# Patient Record
Sex: FEMALE | Race: WHITE | NOT HISPANIC OR LATINO | Employment: FULL TIME | URBAN - METROPOLITAN AREA
[De-identification: names, ages, dates, MRNs, and addresses within clinical notes are randomized per-mention and may not be internally consistent; named-entity substitution may affect disease eponyms.]

---

## 2023-04-25 DIAGNOSIS — N93.8 DUB (DYSFUNCTIONAL UTERINE BLEEDING): Primary | ICD-10-CM

## 2023-04-25 PROBLEM — K52.9 GASTROENTERITIS: Status: ACTIVE | Noted: 2023-04-25

## 2023-04-25 PROBLEM — R87.612 LGSIL ON PAP SMEAR OF CERVIX: Status: ACTIVE | Noted: 2023-04-25

## 2023-04-25 PROBLEM — N76.0 ACUTE VAGINITIS: Status: RESOLVED | Noted: 2023-04-25 | Resolved: 2023-04-25

## 2023-04-25 PROBLEM — L20.9 ATOPIC DERMATITIS: Status: ACTIVE | Noted: 2023-04-25

## 2023-04-25 PROBLEM — B27.90 INFECTIOUS MONONUCLEOSIS: Status: RESOLVED | Noted: 2023-04-25 | Resolved: 2023-04-25

## 2023-04-25 PROBLEM — R53.83 FATIGUE: Status: ACTIVE | Noted: 2023-04-25

## 2023-04-25 PROBLEM — R63.4 ABNORMAL WEIGHT LOSS: Status: ACTIVE | Noted: 2023-04-25

## 2023-04-25 PROBLEM — S90.31XA CONTUSION OF FOOT, RIGHT: Status: ACTIVE | Noted: 2023-04-25

## 2023-04-25 PROBLEM — D72.829 LEUKOCYTOSIS: Status: ACTIVE | Noted: 2023-04-25

## 2023-04-25 PROBLEM — R63.5 ABNORMAL WEIGHT GAIN: Status: ACTIVE | Noted: 2023-04-25

## 2023-04-25 PROBLEM — R87.622 LGSIL PAP SMEAR OF VAGINA: Status: ACTIVE | Noted: 2023-04-25

## 2023-04-25 PROBLEM — R19.7 DIARRHEA: Status: ACTIVE | Noted: 2023-04-25

## 2023-04-25 PROBLEM — R87.810 CERVICAL HIGH RISK HPV (HUMAN PAPILLOMAVIRUS) TEST POSITIVE: Status: ACTIVE | Noted: 2023-04-25

## 2023-04-25 PROBLEM — R74.8 ELEVATED LIVER ENZYMES: Status: ACTIVE | Noted: 2023-04-25

## 2023-04-25 PROBLEM — G47.00 INSOMNIA: Status: ACTIVE | Noted: 2023-04-25

## 2023-04-25 PROBLEM — J02.9 SORE THROAT: Status: RESOLVED | Noted: 2023-04-25 | Resolved: 2023-04-25

## 2023-04-25 PROBLEM — R87.610 ASCUS OF CERVIX WITH NEGATIVE HIGH RISK HPV: Status: ACTIVE | Noted: 2023-04-25

## 2023-04-25 RX ORDER — DROSPIRENONE AND ETHINYL ESTRADIOL 0.02-3(28)
1 KIT ORAL DAILY
COMMUNITY
End: 2023-05-04 | Stop reason: SDUPTHER

## 2023-04-25 RX ORDER — DROSPIRENONE AND ETHINYL ESTRADIOL 0.02-3(28)
1 KIT ORAL DAILY
Qty: 84 TABLET | Refills: 0 | Status: SHIPPED | OUTPATIENT
Start: 2023-04-25 | End: 2023-05-04 | Stop reason: SDUPTHER

## 2023-05-04 ENCOUNTER — OFFICE VISIT (OUTPATIENT)
Dept: PRIMARY CARE | Facility: CLINIC | Age: 28
End: 2023-05-04
Payer: COMMERCIAL

## 2023-05-04 ENCOUNTER — LAB (OUTPATIENT)
Dept: LAB | Facility: LAB | Age: 28
End: 2023-05-04
Payer: COMMERCIAL

## 2023-05-04 VITALS
BODY MASS INDEX: 22.53 KG/M2 | HEART RATE: 87 BPM | DIASTOLIC BLOOD PRESSURE: 74 MMHG | HEIGHT: 64 IN | SYSTOLIC BLOOD PRESSURE: 117 MMHG | WEIGHT: 132 LBS | OXYGEN SATURATION: 97 % | TEMPERATURE: 97.9 F

## 2023-05-04 DIAGNOSIS — E55.9 VITAMIN D DEFICIENCY: ICD-10-CM

## 2023-05-04 DIAGNOSIS — R19.7 DIARRHEA, UNSPECIFIED TYPE: ICD-10-CM

## 2023-05-04 DIAGNOSIS — R87.612 LGSIL ON PAP SMEAR OF CERVIX: ICD-10-CM

## 2023-05-04 DIAGNOSIS — R53.83 FATIGUE, UNSPECIFIED TYPE: ICD-10-CM

## 2023-05-04 DIAGNOSIS — Z00.00 ROUTINE GENERAL MEDICAL EXAMINATION AT A HEALTH CARE FACILITY: ICD-10-CM

## 2023-05-04 DIAGNOSIS — Z00.00 ROUTINE GENERAL MEDICAL EXAMINATION AT A HEALTH CARE FACILITY: Primary | ICD-10-CM

## 2023-05-04 DIAGNOSIS — N93.8 DUB (DYSFUNCTIONAL UTERINE BLEEDING): ICD-10-CM

## 2023-05-04 LAB
ANION GAP IN SER/PLAS: 14 MMOL/L (ref 10–20)
CALCIUM (MG/DL) IN SER/PLAS: 9.3 MG/DL (ref 8.6–10.3)
CARBON DIOXIDE, TOTAL (MMOL/L) IN SER/PLAS: 24 MMOL/L (ref 21–32)
CHLORIDE (MMOL/L) IN SER/PLAS: 103 MMOL/L (ref 98–107)
CREATININE (MG/DL) IN SER/PLAS: 0.84 MG/DL (ref 0.5–1.05)
ERYTHROCYTE DISTRIBUTION WIDTH (RATIO) BY AUTOMATED COUNT: 11.6 % (ref 11.5–14.5)
ERYTHROCYTE MEAN CORPUSCULAR HEMOGLOBIN CONCENTRATION (G/DL) BY AUTOMATED: 31.7 G/DL (ref 32–36)
ERYTHROCYTE MEAN CORPUSCULAR VOLUME (FL) BY AUTOMATED COUNT: 97 FL (ref 80–100)
ERYTHROCYTES (10*6/UL) IN BLOOD BY AUTOMATED COUNT: 4.27 X10E12/L (ref 4–5.2)
GFR FEMALE: >90 ML/MIN/1.73M2
GLUCOSE (MG/DL) IN SER/PLAS: 89 MG/DL (ref 74–99)
HEMATOCRIT (%) IN BLOOD BY AUTOMATED COUNT: 41.6 % (ref 36–46)
HEMOGLOBIN (G/DL) IN BLOOD: 13.2 G/DL (ref 12–16)
LEUKOCYTES (10*3/UL) IN BLOOD BY AUTOMATED COUNT: 5.8 X10E9/L (ref 4.4–11.3)
PLATELETS (10*3/UL) IN BLOOD AUTOMATED COUNT: 290 X10E9/L (ref 150–450)
POTASSIUM (MMOL/L) IN SER/PLAS: 4 MMOL/L (ref 3.5–5.3)
SODIUM (MMOL/L) IN SER/PLAS: 137 MMOL/L (ref 136–145)
THYROTROPIN (MIU/L) IN SER/PLAS BY DETECTION LIMIT <= 0.05 MIU/L: 2.07 MIU/L (ref 0.44–3.98)
UREA NITROGEN (MG/DL) IN SER/PLAS: 11 MG/DL (ref 6–23)

## 2023-05-04 PROCEDURE — 80048 BASIC METABOLIC PNL TOTAL CA: CPT

## 2023-05-04 PROCEDURE — 82306 VITAMIN D 25 HYDROXY: CPT

## 2023-05-04 PROCEDURE — 1036F TOBACCO NON-USER: CPT | Performed by: FAMILY MEDICINE

## 2023-05-04 PROCEDURE — 36415 COLL VENOUS BLD VENIPUNCTURE: CPT

## 2023-05-04 PROCEDURE — 84443 ASSAY THYROID STIM HORMONE: CPT

## 2023-05-04 PROCEDURE — 99395 PREV VISIT EST AGE 18-39: CPT | Performed by: FAMILY MEDICINE

## 2023-05-04 PROCEDURE — 85027 COMPLETE CBC AUTOMATED: CPT

## 2023-05-04 RX ORDER — DROSPIRENONE AND ETHINYL ESTRADIOL 0.02-3(28)
1 KIT ORAL DAILY
Qty: 84 TABLET | Refills: 3 | Status: SHIPPED | OUTPATIENT
Start: 2023-05-04 | End: 2024-05-03 | Stop reason: SDUPTHER

## 2023-05-04 NOTE — PATIENT INSTRUCTIONS
Get your blood work as ordered.  You should hear from our office with results whether they are normal are not within a few days.  Please call the office if you do not hear from us.     You should be getting cardiovascular exercise 3-5 times per week for 30-45 minutes.  This includes exercises such as running, brisk walking, biking or swimming.       Continue with birth control pills as prescribed    Insomnia  MEASURES TO IMPROVE SLEEP QUALITY  1. Go to sleep and wake up at the same time every day, even on holidays and weekends.  2. Use the bed for sleep and sexual activity only, not for reading, watching television, or working. No SCREENS - tablets, phones.  The light emitted from these devices can affect your natural sleep chemical production and negatively affect sleep.  3. Avoid napping for more than 20-30min and not within 6 hours of bedtime.  4. If sleep does not begin within 20 to 30 minutes, leave the bed and return only when drowsy.  5. Keep the room quiet, cool, and dark.  6. Use ear plugs to decrease ambient noise.  7. Avoid caffeine within six hours of bedtime perhaps AT ALL as some people can become very caffeine sensitive.  8. Avoid alcohol and smoking - although both seem to cause a relaxing effect - they in fact will cause poor quality sleep.  9. Establish relaxation pre-sleep rituals, such as a warm bath or reading.  10. Wear socks to bed (lowers core body temperature).  11. Exercise earlier in the day.    IF WAKE UP prior to desired time and cannot fall back asleep within 10min, use technique of concentrating on what you feel of your body - pressure of pillow, mattress, blankets on body.  This focus will distract your cortex of brain that keeps you awake thinking of all that happened prior, or might happen tomorrow keeping you awake.  By turning off the cortex and focusing on basic sense of feel you can relax your brain and allow falling asleep.     Over-the-counter melatonin can oftentimes help  with sleeping, 5-8 mg at night before you go to bed as the recommended dose.  Melatonin does not cause any dependence and can use on a regular basis.  Other sleep medications including some of the over-the-counter medications such as diphenhydramine can cause dependence when used on a regular basis and should only be used intermittently when really needed.Insomnia  MEASURES TO IMPROVE SLEEP QUALITY  1. Go to sleep and wake up at the same time every day, even on holidays and weekends.  2. Use the bed for sleep and sexual activity only, not for reading, watching television, or working. No SCREENS - tablets, phones.  The light emitted from these devices can affect your natural sleep chemical production and negatively affect sleep.  3. Avoid napping for more than 20-30min and not within 6 hours of bedtime.  4. If sleep does not begin within 20 to 30 minutes, leave the bed and return only when drowsy.  5. Keep the room quiet, cool, and dark.  6. Use ear plugs to decrease ambient noise.  7. Avoid caffeine within six hours of bedtime perhaps AT ALL as some people can become very caffeine sensitive.  8. Avoid alcohol and smoking - although both seem to cause a relaxing effect - they in fact will cause poor quality sleep.  9. Establish relaxation pre-sleep rituals, such as a warm bath or reading.  10. Wear socks to bed (lowers core body temperature).  11. Exercise earlier in the day.    IF WAKE UP prior to desired time and cannot fall back asleep within 10min, use technique of concentrating on what you feel of your body - pressure of pillow, mattress, blankets on body.  This focus will distract your cortex of brain that keeps you awake thinking of all that happened prior, or might happen tomorrow keeping you awake.  By turning off the cortex and focusing on basic sense of feel you can relax your brain and allow falling asleep.     Over-the-counter melatonin can oftentimes help with sleeping, 5-8 mg at night before you go to  bed as the recommended dose.  Melatonin does not cause any dependence and can use on a regular basis.  Other sleep medications including some of the over-the-counter medications such as diphenhydramine can cause dependence when used on a regular basis and should only be used intermittently when really needed.    Referral to GI for persisting loose stools

## 2023-05-04 NOTE — PROGRESS NOTES
Subjective   Patient ID: Kait Garnica is a 27 y.o. female who presents for Annual Exam. Just started working for [employer]. No complaints.     Patient is here for follow-up per routine physical  Is on birth control, tolerating well  Low-grade ALFONSO had a colposcopy by GYN last year    Some exercise   No CP or SOB     Fatigue     Loose stools routinely   Last several years few times per day   No elimination diets   No black or blood in stools      ROS :  ( No or Yes )  Any eye problems:    N  Frequent nasal congestion or sneezing:  N  Difficulty hearing:  N  Ear problems:   N  Asthma or wheezing:   N  Frequent cough:   N  Shortness of breath:N  Hemoptysis: N  Hx of TB: N  High blood pressure: N  Heart disease: N  Heart murmur:N  Chest pain or pressure with exertion:N  Leg pains with walking up hill: N  Fast heartbeat or palpitations:N  Varicose veins: N  Difficulty swallowing foods or liquids: N  Abdominal pains: N  Frequent indigestion or heartburn: N  Constipation: N  Diarrhea or loose stools: y  Weight changes recently: y  Change in bowel movements: N  An ulcer: N  Black stools: N  Jaundice, hepatitis or liver problems: N  Gallstones or gallbladder problems: N  Stomach or intestinal problems: N  Vomited blood : N  Blood in bowel movements: N  Sickle cell trait  or Anemia: N  Been refused as a blood donor: N  Problems with her kidney, bladder, or prostate: N  Loss of control of your urine: N  Pain or burning with urination: N  Blood in her urine: N  Trouble starting flow of urine: N  Frequent urination at night: N  History of venereal disease: N  Any skin problems: N  Diabetes: N  Thyroid disease: N  Frequent back pain: N  Pain or swelling around joints: N  Broken any bones: N  Frequent headaches: N  Dizziness: N  Have you ever had Seizures or convulsions: N  Have you ever temporarily lost control of your hand or foot : N   Had a stroke or been paralyzed : N  Temporarily lost your  "ability to speak: N  Fainted or lost consciousness: N  Hallucinations: N  Nervousness: N  Do you take medications for your nerves: N  Trouble falling asleep or staying asleep: y  Do you feel tired even after a good night sleep: y  Do you feel down in the dumps or depressed: y  Frequent crying: N  Using alcohol excessively: N  Any street drug use : N  Do have any other medical problems that are concerns :     Review of Systems    Objective   /74   Pulse 87   Temp 36.6 °C (97.9 °F)   Ht 1.626 m (5' 4\")   Wt 59.9 kg (132 lb)   SpO2 97%   BMI 22.66 kg/m²     Physical Exam  Constitutional:       General: She is not in acute distress.     Appearance: Normal appearance.   HENT:      Head: Normocephalic and atraumatic.      Right Ear: Tympanic membrane, ear canal and external ear normal.      Left Ear: Tympanic membrane, ear canal and external ear normal.      Mouth/Throat:      Mouth: Mucous membranes are moist.      Pharynx: Oropharynx is clear.   Eyes:      Extraocular Movements: Extraocular movements intact.      Conjunctiva/sclera: Conjunctivae normal.      Pupils: Pupils are equal, round, and reactive to light.   Neck:      Vascular: No carotid bruit.   Cardiovascular:      Rate and Rhythm: Normal rate and regular rhythm.      Pulses: Normal pulses.      Heart sounds: Normal heart sounds. No murmur heard.  Pulmonary:      Effort: Pulmonary effort is normal.      Breath sounds: Normal breath sounds. No wheezing, rhonchi or rales.   Abdominal:      General: Abdomen is flat. Bowel sounds are normal. There is no distension.      Palpations: Abdomen is soft.      Tenderness: There is no abdominal tenderness. There is no guarding or rebound.   Musculoskeletal:         General: Normal range of motion.      Cervical back: No tenderness.   Lymphadenopathy:      Cervical: No cervical adenopathy.   Skin:     General: Skin is warm and dry.      Findings: No rash.   Neurological:      General: No focal deficit present. "      Mental Status: She is alert and oriented to person, place, and time.      Cranial Nerves: No cranial nerve deficit.      Coordination: Coordination normal.      Gait: Gait normal.   Psychiatric:         Mood and Affect: Mood normal.         Behavior: Behavior normal.         Assessment/Plan   Problem List Items Addressed This Visit       Diarrhea    Relevant Orders    Referral to Gastroenterology    DUB (dysfunctional uterine bleeding)    Relevant Medications    drospirenone-ethinyl estradioL (Renetta, Gianvi) 3-0.02 mg tablet    Other Relevant Orders    CBC    Basic Metabolic Panel    Vitamin D, Total    Tsh With Reflex To Free T4 If Abnormal    Fatigue    Relevant Medications    drospirenone-ethinyl estradioL (Renetta, Gianvi) 3-0.02 mg tablet    Other Relevant Orders    CBC    Basic Metabolic Panel    Vitamin D, Total    Tsh With Reflex To Free T4 If Abnormal    LGSIL on Pap smear of cervix    Relevant Medications    drospirenone-ethinyl estradioL (Renetta, Gianvi) 3-0.02 mg tablet    Other Relevant Orders    CBC    Basic Metabolic Panel    Vitamin D, Total    Tsh With Reflex To Free T4 If Abnormal     Other Visit Diagnoses       Routine general medical examination at a health care facility    -  Primary    Relevant Medications    drospirenone-ethinyl estradioL (Renetta, Gianvi) 3-0.02 mg tablet    Other Relevant Orders    CBC    Basic Metabolic Panel    Vitamin D, Total    Tsh With Reflex To Free T4 If Abnormal    Vitamin D deficiency        Relevant Medications    drospirenone-ethinyl estradioL (Renetta, Gianvi) 3-0.02 mg tablet    Other Relevant Orders    CBC    Basic Metabolic Panel    Vitamin D, Total    Tsh With Reflex To Free T4 If Abnormal

## 2023-05-05 ENCOUNTER — TELEPHONE (OUTPATIENT)
Dept: PRIMARY CARE | Facility: CLINIC | Age: 28
End: 2023-05-05
Payer: COMMERCIAL

## 2023-05-05 LAB — CALCIDIOL (25 OH VITAMIN D3) (NG/ML) IN SER/PLAS: 24 NG/ML

## 2023-05-05 NOTE — TELEPHONE ENCOUNTER
Result Communication    Resulted Orders   CBC   Result Value Ref Range    WBC 5.8 4.4 - 11.3 x10E9/L    RBC 4.27 4.00 - 5.20 x10E12/L    Hemoglobin 13.2 12.0 - 16.0 g/dL    Hematocrit 41.6 36.0 - 46.0 %    MCV 97 80 - 100 fL    MCHC 31.7 (L) 32.0 - 36.0 g/dL    Platelets 290 150 - 450 x10E9/L    RDW 11.6 11.5 - 14.5 %   Basic Metabolic Panel   Result Value Ref Range    Glucose 89 74 - 99 mg/dL    Sodium 137 136 - 145 mmol/L    Potassium 4.0 3.5 - 5.3 mmol/L    Chloride 103 98 - 107 mmol/L    Bicarbonate 24 21 - 32 mmol/L    Anion Gap 14 10 - 20 mmol/L    Urea Nitrogen 11 6 - 23 mg/dL    Creatinine 0.84 0.50 - 1.05 mg/dL    GFR Female >90 >90 mL/min/1.73m2      Comment:       CALCULATIONS OF ESTIMATED GFR ARE PERFORMED   USING THE 2021 CKD-EPI STUDY REFIT EQUATION   WITHOUT THE RACE VARIABLE FOR THE IDMS-TRACEABLE   CREATININE METHODS.    https://jasn.asnjournals.org/content/early/2021/09/22/ASN.[email or web address]    Calcium 9.3 8.6 - 10.3 mg/dL   Vitamin D, Total   Result Value Ref Range    Vitamin D, 25-Hydroxy 24 (A) ng/mL      Comment:      .  DEFICIENCY:         < 20   NG/ML  INSUFFICIENCY:      20-29  NG/ML  SUFFICIENCY:         NG/ML    THIS ASSAY ACCURATELY QUANTIFIES THE SUM OF  VITAMIN D3, 25-HYDROXY AND VIT D2,25-HYDROXY.   Tsh With Reflex To Free T4 If Abnormal   Result Value Ref Range    TSH 2.07 0.44 - 3.98 mIU/L      Comment:       TSH testing is performed using different testing    methodology at [facility] than at other    [facility] hospitals. Direct result comparisons should    only be made within the same method.       9:46 AM      Results were successfully communicated with the patient and they acknowledged their understanding.

## 2023-05-05 NOTE — RESULT ENCOUNTER NOTE
Please notify pt of lab results everything is normal except vitamin D level is a little low recommend increasing intake by 1000 units/day

## 2023-05-05 NOTE — TELEPHONE ENCOUNTER
----- Message from Alycia Escobar MD sent at 5/5/2023  8:10 AM EDT -----  Please notify pt of lab results everything is normal except vitamin D level is a little low recommend increasing intake by 1000 units/day

## 2023-10-03 ENCOUNTER — OFFICE VISIT (OUTPATIENT)
Dept: PRIMARY CARE | Facility: CLINIC | Age: 28
End: 2023-10-03
Payer: COMMERCIAL

## 2023-10-03 VITALS
HEIGHT: 64 IN | TEMPERATURE: 98.4 F | HEART RATE: 89 BPM | DIASTOLIC BLOOD PRESSURE: 84 MMHG | BODY MASS INDEX: 21.68 KG/M2 | SYSTOLIC BLOOD PRESSURE: 130 MMHG | WEIGHT: 127 LBS | OXYGEN SATURATION: 99 %

## 2023-10-03 DIAGNOSIS — N76.0 ACUTE VAGINITIS: Primary | ICD-10-CM

## 2023-10-03 DIAGNOSIS — R30.0 DYSURIA: ICD-10-CM

## 2023-10-03 DIAGNOSIS — N89.8 VAGINAL ITCHING: ICD-10-CM

## 2023-10-03 LAB
POC APPEARANCE, URINE: CLEAR
POC BILIRUBIN, URINE: NEGATIVE
POC BLOOD, URINE: ABNORMAL
POC COLOR, URINE: YELLOW
POC GLUCOSE, URINE: NEGATIVE MG/DL
POC KETONES, URINE: ABNORMAL MG/DL
POC LEUKOCYTES, URINE: NEGATIVE
POC NITRITE,URINE: NEGATIVE
POC PH, URINE: 6.5 PH
POC PROTEIN, URINE: ABNORMAL MG/DL
POC SPECIFIC GRAVITY, URINE: 1.02
POC UROBILINOGEN, URINE: 0.2 EU/DL

## 2023-10-03 PROCEDURE — 81003 URINALYSIS AUTO W/O SCOPE: CPT | Performed by: FAMILY MEDICINE

## 2023-10-03 PROCEDURE — 99213 OFFICE O/P EST LOW 20 MIN: CPT | Performed by: FAMILY MEDICINE

## 2023-10-03 PROCEDURE — 1036F TOBACCO NON-USER: CPT | Performed by: FAMILY MEDICINE

## 2023-10-03 NOTE — PATIENT INSTRUCTIONS
Vaginitis likely yeast    Unremarkable urine     We will send STD tests    If further concern would suggest blood work for STDs also

## 2023-10-03 NOTE — PROGRESS NOTES
"Subjective   Patient ID: Kait Garnica is a 28 y.o. female who presents for Vaginal Discharge and Vaginal Itching.    2 weeks   Itching and discharge   Some burning on outside     No recent Abx ,    No Otc meds tried     Possible STD exposure   Period since Sat     Vaginal Discharge  The patient's primary symptoms include genital itching and vaginal discharge. This is a new problem. The current episode started 1 to 4 weeks ago. The problem occurs intermittently. The problem has been unchanged. The patient is experiencing no pain. The problem affects both sides. She is not pregnant. The vaginal discharge was milky. Nothing aggravates the symptoms. She has tried nothing for the symptoms.   Vaginal Itching  The patient's primary symptoms include genital itching and vaginal discharge.        Review of Systems   Genitourinary:  Positive for vaginal discharge.       Objective   /84   Pulse 89   Temp 36.9 °C (98.4 °F)   Ht 1.619 m (5' 3.75\")   Wt 57.6 kg (127 lb)   LMP 09/30/2023 (Approximate)   SpO2 99%   BMI 21.97 kg/m²     Physical Exam  Chaperone present: Chaperone offered for breast & pelvic exam - patient declines..   Genitourinary:     Exam position: Lithotomy position.      Pubic Area: No rash.       Labia:         Right: No rash or lesion.         Left: No rash or lesion.       Urethra: No prolapse, urethral pain, urethral swelling or urethral lesion.      Vagina: Vaginal discharge present. No bleeding or lesions.      Cervix: Normal.      Uterus: Normal. Not deviated, not enlarged, not fixed, not tender and no uterine prolapse.       Adnexa: Right adnexa normal and left adnexa normal.        Right: No mass, tenderness or fullness.          Left: No mass, tenderness or fullness.        Rectum: Normal. No external hemorrhoid.      Comments: Erythematous vulva and vagina  Neurological:      General: No focal deficit present.   Psychiatric:         Mood and Affect: Mood normal.         Assessment/Plan "   Problem List Items Addressed This Visit    None  Visit Diagnoses         Codes    Acute vaginitis    -  Primary N76.0    Relevant Orders    Vaginitis Gram Stain For Bacterial Vaginosis + Yeast    C. Trachomatis / N. Gonorrhoeae, Amplified Detection    Trichomonas vaginalis, Amplified    Vaginal itching     N89.8    Relevant Orders    POCT UA Automated manually resulted (Completed)    Vaginitis Gram Stain For Bacterial Vaginosis + Yeast    C. Trachomatis / N. Gonorrhoeae, Amplified Detection    Trichomonas vaginalis, Amplified    Dysuria     R30.0    Relevant Orders    Vaginitis Gram Stain For Bacterial Vaginosis + Yeast    C. Trachomatis / N. Gonorrhoeae, Amplified Detection    Trichomonas vaginalis, Amplified

## 2023-10-04 DIAGNOSIS — N89.8 VAGINAL ITCHING: Primary | ICD-10-CM

## 2023-10-04 LAB
C TRACH RRNA SPEC QL NAA+PROBE: NEGATIVE
CLUE CELLS VAG LPF-#/AREA: NORMAL /[LPF]
N GONORRHOEA DNA SPEC QL PROBE+SIG AMP: NEGATIVE
NUGENT SCORE: 1
T VAGINALIS RRNA SPEC QL NAA+PROBE: NEGATIVE
YEAST VAG WET PREP-#/AREA: NORMAL

## 2023-10-04 RX ORDER — FLUCONAZOLE 150 MG/1
150 TABLET ORAL ONCE
Qty: 1 TABLET | Refills: 0 | Status: SHIPPED | OUTPATIENT
Start: 2023-10-04 | End: 2023-10-04

## 2023-11-16 ENCOUNTER — OFFICE VISIT (OUTPATIENT)
Dept: PRIMARY CARE | Facility: CLINIC | Age: 28
End: 2023-11-16
Payer: COMMERCIAL

## 2023-11-16 VITALS
OXYGEN SATURATION: 98 % | SYSTOLIC BLOOD PRESSURE: 124 MMHG | HEIGHT: 64 IN | WEIGHT: 132 LBS | DIASTOLIC BLOOD PRESSURE: 86 MMHG | HEART RATE: 89 BPM | BODY MASS INDEX: 22.53 KG/M2 | TEMPERATURE: 98.2 F

## 2023-11-16 DIAGNOSIS — J40 BRONCHITIS: Primary | ICD-10-CM

## 2023-11-16 PROCEDURE — 99213 OFFICE O/P EST LOW 20 MIN: CPT | Performed by: FAMILY MEDICINE

## 2023-11-16 PROCEDURE — 1036F TOBACCO NON-USER: CPT | Performed by: FAMILY MEDICINE

## 2023-11-16 RX ORDER — AZITHROMYCIN 250 MG/1
TABLET, FILM COATED ORAL
Qty: 6 TABLET | Refills: 0 | Status: SHIPPED | OUTPATIENT
Start: 2023-11-16 | End: 2023-11-21

## 2023-11-16 ASSESSMENT — ENCOUNTER SYMPTOMS
COUGH: 1
SORE THROAT: 1

## 2023-11-16 NOTE — PROGRESS NOTES
Subjective   Patient ID: Kait Garnica is a 28 y.o. female who presents for URI. Home covid test negative. Concerned with pneumonia.    Cough   Chest heaviness   Last 4 days     Fever, ST     URI   This is a new problem. The current episode started in the past 7 days. The problem has been gradually worsening. The maximum temperature recorded prior to her arrival was 101 - 101.9 F. The fever has been present for Less than 1 day. Associated symptoms include coughing and a sore throat. She has tried NSAIDs and decongestant for the symptoms. The treatment provided mild relief.        Review of Systems   HENT:  Positive for sore throat.    Respiratory:  Positive for cough.        Objective   There were no vitals taken for this visit.    Physical Exam  Constitutional:       Appearance: Normal appearance.   HENT:      Head: Normocephalic and atraumatic.      Right Ear: Tympanic membrane and ear canal normal.      Left Ear: Tympanic membrane and ear canal normal.      Nose: No nasal deformity.      Right Sinus: No maxillary sinus tenderness or frontal sinus tenderness.      Left Sinus: No maxillary sinus tenderness or frontal sinus tenderness.      Mouth/Throat:      Mouth: Mucous membranes are moist. No oral lesions.      Tongue: No lesions.      Pharynx: Oropharynx is clear.   Cardiovascular:      Rate and Rhythm: Normal rate and regular rhythm.   Pulmonary:      Effort: Pulmonary effort is normal.      Breath sounds: Normal breath sounds.      Comments: Wet cough  Musculoskeletal:      Cervical back: No tenderness.   Lymphadenopathy:      Cervical: No cervical adenopathy.   Neurological:      Mental Status: She is alert.         Assessment/Plan   Problem List Items Addressed This Visit    None  Visit Diagnoses         Codes    Bronchitis    -  Primary J40    Relevant Medications    azithromycin (Zithromax) 250 mg tablet

## 2023-11-29 ENCOUNTER — OFFICE VISIT (OUTPATIENT)
Dept: PRIMARY CARE | Facility: CLINIC | Age: 28
End: 2023-11-29
Payer: COMMERCIAL

## 2023-11-29 VITALS
TEMPERATURE: 98.1 F | DIASTOLIC BLOOD PRESSURE: 85 MMHG | SYSTOLIC BLOOD PRESSURE: 143 MMHG | HEIGHT: 64 IN | BODY MASS INDEX: 22.53 KG/M2 | WEIGHT: 132 LBS | OXYGEN SATURATION: 96 % | HEART RATE: 102 BPM

## 2023-11-29 DIAGNOSIS — J01.00 ACUTE NON-RECURRENT MAXILLARY SINUSITIS: Primary | ICD-10-CM

## 2023-11-29 PROCEDURE — 99213 OFFICE O/P EST LOW 20 MIN: CPT | Performed by: FAMILY MEDICINE

## 2023-11-29 PROCEDURE — 1036F TOBACCO NON-USER: CPT | Performed by: FAMILY MEDICINE

## 2023-11-29 RX ORDER — DOXYCYCLINE 100 MG/1
100 CAPSULE ORAL 2 TIMES DAILY
Qty: 20 CAPSULE | Refills: 0 | Status: SHIPPED | OUTPATIENT
Start: 2023-11-29 | End: 2023-12-09

## 2023-11-29 ASSESSMENT — ENCOUNTER SYMPTOMS
COUGH: 1
FEVER: 0
SHORTNESS OF BREATH: 0

## 2023-11-29 NOTE — PROGRESS NOTES
"Subjective   Patient ID: Kait Garnica is a 28 y.o. female who presents for Cough. Finished the Zpak you prescribed at her previous visit. [place] better for about three days then began feeling ill again this past Sunday. Cough, sinus congestion and drainage in addition to a bad HA. Taking OTC Cough drops. Pt did not retest for covid.    Felt better on Abx for a few days   Cough persisting      Slight sob with congestion          Review of Systems   Constitutional:  Negative for fever.   HENT:  Positive for congestion.    Respiratory:  Positive for cough. Negative for shortness of breath.        Objective   /85   Pulse 102   Temp 36.7 °C (98.1 °F)   Ht 1.619 m (5' 3.75\")   Wt 59.9 kg (132 lb)   SpO2 96%   BMI 22.84 kg/m²     Physical Exam  Constitutional:       Appearance: Normal appearance.   HENT:      Head: Normocephalic and atraumatic.      Right Ear: Tympanic membrane and ear canal normal.      Left Ear: Tympanic membrane and ear canal normal.      Nose: No nasal deformity.      Right Sinus: Frontal sinus tenderness present. No maxillary sinus tenderness.      Left Sinus: Frontal sinus tenderness present. No maxillary sinus tenderness.      Mouth/Throat:      Mouth: Mucous membranes are moist. No oral lesions.      Tongue: No lesions.      Pharynx: Oropharynx is clear.   Cardiovascular:      Rate and Rhythm: Normal rate and regular rhythm.   Pulmonary:      Effort: Pulmonary effort is normal.      Breath sounds: Normal breath sounds.   Musculoskeletal:      Cervical back: No tenderness.   Lymphadenopathy:      Cervical: No cervical adenopathy.   Neurological:      Mental Status: She is alert.         Assessment/Plan   Problem List Items Addressed This Visit    None  Visit Diagnoses         Codes    Acute non-recurrent maxillary sinusitis    -  Primary J01.00    Relevant Medications    doxycycline (Vibramycin) 100 mg capsule               "

## 2024-05-01 PROBLEM — N87.0 CIN I (CERVICAL INTRAEPITHELIAL NEOPLASIA I): Status: ACTIVE | Noted: 2024-05-01

## 2024-05-02 ENCOUNTER — OFFICE VISIT (OUTPATIENT)
Dept: PRIMARY CARE | Facility: CLINIC | Age: 29
End: 2024-05-02
Payer: COMMERCIAL

## 2024-05-02 VITALS
HEIGHT: 64 IN | OXYGEN SATURATION: 98 % | WEIGHT: 143 LBS | DIASTOLIC BLOOD PRESSURE: 84 MMHG | SYSTOLIC BLOOD PRESSURE: 132 MMHG | BODY MASS INDEX: 24.41 KG/M2 | HEART RATE: 88 BPM | TEMPERATURE: 98.2 F

## 2024-05-02 DIAGNOSIS — T75.3XXA MOTION SICKNESS, INITIAL ENCOUNTER: ICD-10-CM

## 2024-05-02 DIAGNOSIS — R53.83 FATIGUE, UNSPECIFIED TYPE: ICD-10-CM

## 2024-05-02 DIAGNOSIS — Z00.00 ROUTINE GENERAL MEDICAL EXAMINATION AT A HEALTH CARE FACILITY: Primary | ICD-10-CM

## 2024-05-02 DIAGNOSIS — R79.89 LOW VITAMIN D LEVEL: ICD-10-CM

## 2024-05-02 DIAGNOSIS — R63.5 WEIGHT GAIN: ICD-10-CM

## 2024-05-02 PROBLEM — K52.9 GASTROENTERITIS: Status: RESOLVED | Noted: 2023-04-25 | Resolved: 2024-05-02

## 2024-05-02 PROBLEM — R87.612 LGSIL ON PAP SMEAR OF CERVIX: Status: RESOLVED | Noted: 2023-04-25 | Resolved: 2024-05-02

## 2024-05-02 PROBLEM — R87.622 LGSIL PAP SMEAR OF VAGINA: Status: RESOLVED | Noted: 2023-04-25 | Resolved: 2024-05-02

## 2024-05-02 PROBLEM — R19.7 DIARRHEA: Status: RESOLVED | Noted: 2023-04-25 | Resolved: 2024-05-02

## 2024-05-02 PROBLEM — S90.31XA CONTUSION OF FOOT, RIGHT: Status: RESOLVED | Noted: 2023-04-25 | Resolved: 2024-05-02

## 2024-05-02 PROCEDURE — 99395 PREV VISIT EST AGE 18-39: CPT | Performed by: FAMILY MEDICINE

## 2024-05-02 PROCEDURE — 1036F TOBACCO NON-USER: CPT | Performed by: FAMILY MEDICINE

## 2024-05-02 RX ORDER — ONDANSETRON 4 MG/1
4 TABLET, FILM COATED ORAL EVERY 8 HOURS PRN
Qty: 20 TABLET | Refills: 1 | Status: SHIPPED | OUTPATIENT
Start: 2024-05-02 | End: 2024-05-17

## 2024-05-02 RX ORDER — SCOLOPAMINE TRANSDERMAL SYSTEM 1 MG/1
1 PATCH, EXTENDED RELEASE TRANSDERMAL
Qty: 4 PATCH | Refills: 0 | Status: SHIPPED | OUTPATIENT
Start: 2024-05-02 | End: 2024-07-01

## 2024-05-02 ASSESSMENT — PATIENT HEALTH QUESTIONNAIRE - PHQ9
SUM OF ALL RESPONSES TO PHQ9 QUESTIONS 1 AND 2: 0
1. LITTLE INTEREST OR PLEASURE IN DOING THINGS: NOT AT ALL
2. FEELING DOWN, DEPRESSED OR HOPELESS: NOT AT ALL

## 2024-05-02 ASSESSMENT — ENCOUNTER SYMPTOMS: FATIGUE: 1

## 2024-05-02 NOTE — PROGRESS NOTES
Subjective   Patient ID: Kait Garnica is a 28 y.o. female who presents for Annual Exam.  Pt is going on cruise and was wondering about motion sick medication        Is going on a cruise     Concerned about motion sickness     Some exercise     Changed ocps , had nausea with it     Some weight gain        ROS :  ( No or Yes )  Any eye problems:    N  Frequent nasal congestion or sneezing:  N  Difficulty hearing:  N  Ear problems:   N  Asthma or wheezing:   N  Frequent cough:   N  Shortness of breath:N  Hemoptysis: N  Hx of TB: N  High blood pressure: N  Heart disease: N  Heart murmur:N  Chest pain or pressure with exertion:N  Leg pains with walking up hill: N  Fast heartbeat or palpitations:N  Varicose veins: N  Difficulty swallowing foods or liquids: N  Abdominal pains: N  Frequent indigestion or heartburn: y  Constipation: N  Diarrhea or loose stools: y  Weight changes recently: y  Change in bowel movements: N  An ulcer: N  Black stools: N  Jaundice, hepatitis or liver problems: N  Gallstones or gallbladder problems: N  Stomach or intestinal problems: N  Vomited blood : N  Blood in bowel movements: N  Sickle cell trait  or Anemia: N  Been refused as a blood donor: N  Problems with her kidney, bladder, or prostate: N  Loss of control of your urine: N  Pain or burning with urination: N  Blood in her urine: N  Trouble starting flow of urine: N  Frequent urination at night: N  History of venereal disease: N  Any skin problems: N  Diabetes: N  Thyroid disease: N  Frequent back pain: N  Pain or swelling around joints: N  Broken any bones: N  Frequent headaches: N  Dizziness: N  Have you ever had Seizures or convulsions: N  Have you ever temporarily lost control of your hand or foot : N   Had a stroke or been paralyzed : N  Temporarily lost your ability to speak: N  Fainted or lost consciousness: N  Hallucinations: N  Nervousness: N  Do you take medications for your nerves: N  Trouble falling asleep or staying asleep:  "y  Do you feel tired even after a good night sleep: y  Do you feel down in the dumps or depressed: N  Frequent crying: N  Using alcohol excessively: N  Any street drug use : N  Do have any other medical problems that are concerns :      Review of Systems   Constitutional:  Positive for fatigue.       Objective   /84   Pulse 88   Temp 36.8 °C (98.2 °F)   Ht 1.619 m (5' 3.75\")   Wt 64.9 kg (143 lb)   LMP 04/13/2024   SpO2 98%   BMI 24.74 kg/m²     Physical Exam  Constitutional:       General: She is not in acute distress.     Appearance: Normal appearance.   HENT:      Head: Normocephalic and atraumatic.      Right Ear: Tympanic membrane, ear canal and external ear normal.      Left Ear: Tympanic membrane, ear canal and external ear normal.      Nose: Nose normal.      Mouth/Throat:      Mouth: Mucous membranes are moist.      Pharynx: No oropharyngeal exudate or posterior oropharyngeal erythema.   Eyes:      Extraocular Movements: Extraocular movements intact.      Conjunctiva/sclera: Conjunctivae normal.      Pupils: Pupils are equal, round, and reactive to light.   Cardiovascular:      Rate and Rhythm: Normal rate and regular rhythm.      Heart sounds: No murmur heard.  Pulmonary:      Effort: Pulmonary effort is normal.      Breath sounds: Normal breath sounds.   Abdominal:      General: Bowel sounds are normal.      Palpations: Abdomen is soft.   Musculoskeletal:         General: Normal range of motion.      Cervical back: No rigidity.   Lymphadenopathy:      Cervical: No cervical adenopathy.   Skin:     General: Skin is warm and dry.      Findings: No rash.   Neurological:      General: No focal deficit present.      Mental Status: She is alert and oriented to person, place, and time.      Cranial Nerves: No cranial nerve deficit.      Gait: Gait normal.   Psychiatric:         Mood and Affect: Mood normal.         Behavior: Behavior normal.         Assessment/Plan   Problem List Items Addressed This " Visit             ICD-10-CM    Fatigue R53.83    Relevant Orders    CBC    Thyroid Stimulating Hormone    Lipid Panel    Comprehensive Metabolic Panel    Thyroxine, Free    Hemoglobin A1C     Other Visit Diagnoses         Codes    Routine general medical examination at a health care facility    -  Primary Z00.00    Relevant Orders    CBC    Thyroid Stimulating Hormone    Lipid Panel    Comprehensive Metabolic Panel    Thyroxine, Free    Hemoglobin A1C    Low vitamin D level     R79.89    Relevant Orders    CBC    Thyroid Stimulating Hormone    Lipid Panel    Comprehensive Metabolic Panel    Thyroxine, Free    Hemoglobin A1C    Motion sickness, initial encounter     T75.3XXA    Relevant Medications    scopolamine (Transderm-Scop) 1 mg over 3 days patch 3 day    ondansetron (Zofran) 4 mg tablet    Other Relevant Orders    CBC    Thyroid Stimulating Hormone    Lipid Panel    Comprehensive Metabolic Panel    Thyroxine, Free    Hemoglobin A1C    Weight gain     R63.5    Relevant Orders    CBC    Thyroid Stimulating Hormone    Lipid Panel    Comprehensive Metabolic Panel    Thyroxine, Free    Hemoglobin A1C

## 2024-05-02 NOTE — PATIENT INSTRUCTIONS
Get your blood work as ordered.  You should hear from our office with results whether they are normal are not within a few days.  Please call the office if you do not hear from usAfter you get testing done you will get notified from our office with regards to your results whether they are normal or not.  If you are registered in the electronic health record we will send you information in that system.  If you have any questions or need clarification please feel free to call the office    You should be getting cardiovascular exercise 3-5 times per week for 30-45 minutes.  This includes exercises such as running, brisk walking, biking or swimming.      Get your blood work as ordered.  You should hear from our office with results whether they are normal are not within a few days.  Please call the office if you do not hear from us.      Trial of patches   Or zofran     See what works best

## 2024-05-03 ENCOUNTER — TELEPHONE (OUTPATIENT)
Dept: PRIMARY CARE | Facility: CLINIC | Age: 29
End: 2024-05-03
Payer: COMMERCIAL

## 2024-05-03 DIAGNOSIS — Z00.00 ROUTINE GENERAL MEDICAL EXAMINATION AT A HEALTH CARE FACILITY: ICD-10-CM

## 2024-05-03 DIAGNOSIS — N93.8 DUB (DYSFUNCTIONAL UTERINE BLEEDING): ICD-10-CM

## 2024-05-03 DIAGNOSIS — R87.612 LGSIL ON PAP SMEAR OF CERVIX: ICD-10-CM

## 2024-05-03 DIAGNOSIS — R53.83 FATIGUE, UNSPECIFIED TYPE: ICD-10-CM

## 2024-05-03 DIAGNOSIS — E55.9 VITAMIN D DEFICIENCY: ICD-10-CM

## 2024-05-03 NOTE — TELEPHONE ENCOUNTER
MEDICATION REFILL    Pharmacy Name:     [facility]/  Medication requested          Drospirenone-ethinyl estradiol 3-0.02  Dosage   1 daily  Quantity    90 days    Allergies     none given  Date of last visit     05/02/2024  Date of Next Appointment [  ]

## 2024-05-06 RX ORDER — DROSPIRENONE AND ETHINYL ESTRADIOL 0.02-3(28)
1 KIT ORAL DAILY
Qty: 84 TABLET | Refills: 3 | Status: SHIPPED | OUTPATIENT
Start: 2024-05-06

## 2024-05-09 ENCOUNTER — LAB (OUTPATIENT)
Dept: LAB | Facility: LAB | Age: 29
End: 2024-05-09
Payer: COMMERCIAL

## 2024-05-09 DIAGNOSIS — R63.5 WEIGHT GAIN: ICD-10-CM

## 2024-05-09 DIAGNOSIS — R53.83 FATIGUE, UNSPECIFIED TYPE: ICD-10-CM

## 2024-05-09 DIAGNOSIS — T75.3XXA MOTION SICKNESS, INITIAL ENCOUNTER: ICD-10-CM

## 2024-05-09 DIAGNOSIS — Z00.00 ROUTINE GENERAL MEDICAL EXAMINATION AT A HEALTH CARE FACILITY: ICD-10-CM

## 2024-05-09 DIAGNOSIS — R79.89 LOW VITAMIN D LEVEL: ICD-10-CM

## 2024-05-09 LAB
ALBUMIN SERPL BCP-MCNC: 4 G/DL (ref 3.4–5)
ALP SERPL-CCNC: 33 U/L (ref 33–110)
ALT SERPL W P-5'-P-CCNC: 14 U/L (ref 7–45)
ANION GAP SERPL CALC-SCNC: 15 MMOL/L (ref 10–20)
AST SERPL W P-5'-P-CCNC: 18 U/L (ref 9–39)
BILIRUB SERPL-MCNC: 0.4 MG/DL (ref 0–1.2)
BUN SERPL-MCNC: 9 MG/DL (ref 6–23)
CALCIUM SERPL-MCNC: 9 MG/DL (ref 8.6–10.3)
CHLORIDE SERPL-SCNC: 103 MMOL/L (ref 98–107)
CHOLEST SERPL-MCNC: 191 MG/DL (ref 0–199)
CHOLESTEROL/HDL RATIO: 2.8
CO2 SERPL-SCNC: 25 MMOL/L (ref 21–32)
CREAT SERPL-MCNC: 0.81 MG/DL (ref 0.5–1.05)
EGFRCR SERPLBLD CKD-EPI 2021: >90 ML/MIN/1.73M*2
ERYTHROCYTE [DISTWIDTH] IN BLOOD BY AUTOMATED COUNT: 11.9 % (ref 11.5–14.5)
EST. AVERAGE GLUCOSE BLD GHB EST-MCNC: 91 MG/DL
GLUCOSE SERPL-MCNC: 84 MG/DL (ref 74–99)
HBA1C MFR BLD: 4.8 %
HCT VFR BLD AUTO: 40.1 % (ref 36–46)
HDLC SERPL-MCNC: 67.5 MG/DL
HGB BLD-MCNC: 12.9 G/DL (ref 12–16)
LDLC SERPL CALC-MCNC: 96 MG/DL
MCH RBC QN AUTO: 31.5 PG (ref 26–34)
MCHC RBC AUTO-ENTMCNC: 32.2 G/DL (ref 32–36)
MCV RBC AUTO: 98 FL (ref 80–100)
NON HDL CHOLESTEROL: 124 MG/DL (ref 0–149)
NRBC BLD-RTO: 0 /100 WBCS (ref 0–0)
PLATELET # BLD AUTO: 339 X10*3/UL (ref 150–450)
POTASSIUM SERPL-SCNC: 5 MMOL/L (ref 3.5–5.3)
PROT SERPL-MCNC: 6.7 G/DL (ref 6.4–8.2)
RBC # BLD AUTO: 4.09 X10*6/UL (ref 4–5.2)
SODIUM SERPL-SCNC: 138 MMOL/L (ref 136–145)
T4 FREE SERPL-MCNC: 0.85 NG/DL (ref 0.61–1.12)
TRIGL SERPL-MCNC: 137 MG/DL (ref 0–149)
TSH SERPL-ACNC: 2.32 MIU/L (ref 0.44–3.98)
VLDL: 27 MG/DL (ref 0–40)
WBC # BLD AUTO: 5.9 X10*3/UL (ref 4.4–11.3)

## 2024-05-09 PROCEDURE — 84443 ASSAY THYROID STIM HORMONE: CPT

## 2024-05-09 PROCEDURE — 80061 LIPID PANEL: CPT

## 2024-05-09 PROCEDURE — 83036 HEMOGLOBIN GLYCOSYLATED A1C: CPT

## 2024-05-09 PROCEDURE — 36415 COLL VENOUS BLD VENIPUNCTURE: CPT

## 2024-05-09 PROCEDURE — 85027 COMPLETE CBC AUTOMATED: CPT

## 2024-05-09 PROCEDURE — 80053 COMPREHEN METABOLIC PANEL: CPT

## 2024-05-09 PROCEDURE — 84439 ASSAY OF FREE THYROXINE: CPT

## 2025-02-24 ENCOUNTER — APPOINTMENT (OUTPATIENT)
Dept: PRIMARY CARE | Facility: CLINIC | Age: 30
End: 2025-02-24
Payer: COMMERCIAL

## 2025-02-24 VITALS
WEIGHT: 138 LBS | OXYGEN SATURATION: 93 % | SYSTOLIC BLOOD PRESSURE: 126 MMHG | HEIGHT: 64 IN | DIASTOLIC BLOOD PRESSURE: 76 MMHG | BODY MASS INDEX: 23.56 KG/M2 | TEMPERATURE: 98.8 F

## 2025-02-24 DIAGNOSIS — N76.0 ACUTE VAGINITIS: Primary | ICD-10-CM

## 2025-02-24 DIAGNOSIS — N93.8 DUB (DYSFUNCTIONAL UTERINE BLEEDING): ICD-10-CM

## 2025-02-24 PROCEDURE — 99213 OFFICE O/P EST LOW 20 MIN: CPT | Performed by: FAMILY MEDICINE

## 2025-02-24 PROCEDURE — 3008F BODY MASS INDEX DOCD: CPT | Performed by: FAMILY MEDICINE

## 2025-02-24 RX ORDER — DROSPIRENONE AND ETHINYL ESTRADIOL 0.02-3(28)
1 KIT ORAL DAILY
Qty: 84 TABLET | Refills: 3 | Status: SHIPPED | OUTPATIENT
Start: 2025-02-24

## 2025-02-24 NOTE — PROGRESS NOTES
"Subjective   Patient ID: Kait Garnica is a 29 y.o. female who presents for vaginal odor and burning .  Sx's onset about 3 wks ago; decided to wait to come in due to her recent period.       External irritation     Burning   And odor     Recent  period     No std exposure   On OCPs for dysfunctional uterine bleeding, is not having any issues,.  Comes a little bit late but still getting it every 3 months         Review of Systems    Objective   /76   Temp 37.1 °C (98.8 °F)   Ht 1.619 m (5' 3.75\")   Wt 62.6 kg (138 lb)   SpO2 93%   BMI 23.87 kg/m²     Physical Exam  Constitutional:       Appearance: Normal appearance.   Genitourinary:         Comments: Vulvar erythema  Minimal discharge  Culture taken  Neurological:      Mental Status: She is alert.         Assessment/Plan   Problem List Items Addressed This Visit             ICD-10-CM    DUB (dysfunctional uterine bleeding) N93.8    Relevant Medications    drospirenone-ethinyl estradiol (Renetta) 3-0.02 mg tablet     Other Visit Diagnoses         Codes    Acute vaginitis    -  Primary N76.0    Relevant Orders    Vaginitis Gram Stain For Bacterial Vaginosis + Yeast    POCT UA Automated manually resulted               "

## 2025-02-24 NOTE — PROGRESS NOTES
Subjective   Patient ID: Kait Garnica is a 29 y.o. female who presents for Vaginal Itching.    HPI     Review of Systems    Objective   There were no vitals taken for this visit.    Physical Exam    Assessment/Plan   {Assess/PlanSmartLinks:[id number]}

## 2025-02-25 DIAGNOSIS — N76.0 ACUTE VAGINITIS: Primary | ICD-10-CM

## 2025-02-25 LAB — BV SCORE VAG QL: NORMAL

## 2025-02-25 RX ORDER — METRONIDAZOLE 7.5 MG/G
GEL VAGINAL NIGHTLY
Qty: 70 G | Refills: 0 | Status: SHIPPED | OUTPATIENT
Start: 2025-02-25 | End: 2025-03-02

## 2025-02-28 ENCOUNTER — TELEPHONE (OUTPATIENT)
Dept: PRIMARY CARE | Facility: CLINIC | Age: 30
End: 2025-02-28
Payer: COMMERCIAL

## 2025-02-28 NOTE — TELEPHONE ENCOUNTER
Pt states she is waking up overheated at night, since starting the metronidazole.  The first night it woke her up, and she could not get comfortable enough to go back to sleep.  It seems to be a little better now, but still waking up nightly.  She also has a new tattoo and wasn't sure if that would make a difference?

## 2025-05-07 PROBLEM — A49.8 CLOSTRIDIOIDES DIFFICILE INFECTION: Status: RESOLVED | Noted: 2025-05-07 | Resolved: 2025-05-07

## 2025-05-07 PROBLEM — N89.8 PRURITUS OF VAGINA: Status: RESOLVED | Noted: 2025-05-07 | Resolved: 2025-05-07

## 2025-05-07 PROBLEM — E55.9 VITAMIN D DEFICIENCY: Status: ACTIVE | Noted: 2023-05-04

## 2025-05-07 PROBLEM — J40 BRONCHITIS: Status: RESOLVED | Noted: 2025-05-07 | Resolved: 2025-05-07

## 2025-05-07 PROBLEM — R30.0 DYSURIA: Status: RESOLVED | Noted: 2025-05-07 | Resolved: 2025-05-07

## 2025-05-07 PROBLEM — T75.3XXA MOTION SICKNESS: Status: ACTIVE | Noted: 2025-05-07

## 2025-05-08 ENCOUNTER — APPOINTMENT (OUTPATIENT)
Dept: PRIMARY CARE | Facility: CLINIC | Age: 30
End: 2025-05-08
Payer: COMMERCIAL

## 2025-05-08 VITALS
WEIGHT: 138 LBS | HEIGHT: 64 IN | BODY MASS INDEX: 23.56 KG/M2 | HEART RATE: 96 BPM | TEMPERATURE: 98.1 F | SYSTOLIC BLOOD PRESSURE: 128 MMHG | DIASTOLIC BLOOD PRESSURE: 80 MMHG | OXYGEN SATURATION: 98 %

## 2025-05-08 DIAGNOSIS — Z00.00 ROUTINE GENERAL MEDICAL EXAMINATION AT A HEALTH CARE FACILITY: Primary | ICD-10-CM

## 2025-05-08 DIAGNOSIS — H61.21 IMPACTED CERUMEN OF RIGHT EAR: ICD-10-CM

## 2025-05-08 DIAGNOSIS — M79.604 PAIN OF RIGHT LOWER EXTREMITY: ICD-10-CM

## 2025-05-08 DIAGNOSIS — G43.E09 CHRONIC MIGRAINE WITH AURA WITHOUT STATUS MIGRAINOSUS, NOT INTRACTABLE: ICD-10-CM

## 2025-05-08 DIAGNOSIS — Z13.220 LIPID SCREENING: ICD-10-CM

## 2025-05-08 DIAGNOSIS — R22.41 MASS OF RIGHT LOWER LEG: ICD-10-CM

## 2025-05-08 DIAGNOSIS — E55.9 VITAMIN D DEFICIENCY: ICD-10-CM

## 2025-05-08 PROBLEM — R63.4 ABNORMAL WEIGHT LOSS: Status: RESOLVED | Noted: 2023-04-25 | Resolved: 2025-05-08

## 2025-05-08 PROBLEM — R63.5 ABNORMAL WEIGHT GAIN: Status: RESOLVED | Noted: 2023-04-25 | Resolved: 2025-05-08

## 2025-05-08 LAB
POC APPEARANCE, URINE: CLEAR
POC BILIRUBIN, URINE: NEGATIVE
POC BLOOD, URINE: ABNORMAL
POC COLOR, URINE: YELLOW
POC GLUCOSE, URINE: NEGATIVE MG/DL
POC KETONES, URINE: NEGATIVE MG/DL
POC LEUKOCYTES, URINE: NEGATIVE
POC NITRITE,URINE: NEGATIVE
POC PH, URINE: 7 PH
POC PROTEIN, URINE: NEGATIVE MG/DL
POC SPECIFIC GRAVITY, URINE: 1.01
POC UROBILINOGEN, URINE: 0.2 EU/DL

## 2025-05-08 PROCEDURE — 99395 PREV VISIT EST AGE 18-39: CPT | Performed by: FAMILY MEDICINE

## 2025-05-08 PROCEDURE — 69210 REMOVE IMPACTED EAR WAX UNI: CPT | Performed by: FAMILY MEDICINE

## 2025-05-08 PROCEDURE — 1036F TOBACCO NON-USER: CPT | Performed by: FAMILY MEDICINE

## 2025-05-08 PROCEDURE — 3008F BODY MASS INDEX DOCD: CPT | Performed by: FAMILY MEDICINE

## 2025-05-08 PROCEDURE — 99213 OFFICE O/P EST LOW 20 MIN: CPT | Performed by: FAMILY MEDICINE

## 2025-05-08 PROCEDURE — 81003 URINALYSIS AUTO W/O SCOPE: CPT | Performed by: FAMILY MEDICINE

## 2025-05-08 ASSESSMENT — ENCOUNTER SYMPTOMS
NUMBNESS: 0
DIZZINESS: 0
HEADACHES: 1

## 2025-05-08 NOTE — PROGRESS NOTES
Subjective   Patient ID: Kait Garnica is a 29 y.o. female who presents for Annual Exam. States she went for her eye exam; discussed migraines; says the sx's are improving as she is adjusting to her RX. Right ear has had some fluid in it and her right lower leg has a painful lump she is concerned with.        Headache last week with visual sxs  lasted for a few days   Nausea, no dizziness   Optical   Saw eye dr got glasses   Tried OTC aleve / ibuprofen   Not bad with pain component   Some nausea       Right ear feels wet     Right lower leg with edema : several months   Pain with tying shoes too tight     ?  Possibly sports trauma     Seeing GYN for PAPs      ROS :  ( No or Yes )  Any eye problems:    y  Frequent nasal congestion or sneezing:  N  Difficulty hearing:  N  Ear problems:   N  Asthma or wheezing:   N  Frequent cough:   N  Shortness of breath:N  Hemoptysis: N  Hx of TB: N  High blood pressure: N  Heart disease: N  Heart murmur:N  Chest pain or pressure with exertion:N  Leg pains with walking up hill: N  Fast heartbeat or palpitations:N  Varicose veins: N  Difficulty swallowing foods or liquids: N  Abdominal pains: N  Frequent indigestion or heartburn: N  Constipation: N  Diarrhea or loose stools: N  Weight changes recently: N  Change in bowel movements: N  An ulcer: N  Black stools: N  Jaundice, hepatitis or liver problems: N  Gallstones or gallbladder problems: N  Stomach or intestinal problems: N  Vomited blood : N  Blood in bowel movements: N  Sickle cell trait  or Anemia: N  Been refused as a blood donor: N  Problems with her kidney, bladder, or prostate: N  Loss of control of your urine: N  Pain or burning with urination: N  Blood in her urine: N  Trouble starting flow of urine: N  Frequent urination at night: N  History of venereal disease: N  Any skin problems: N  Diabetes: N  Thyroid disease: N  Frequent back pain: y  Pain or swelling around joints: N  Broken any bones: N  Frequent headaches:  "y  Dizziness: y  Have you ever had Seizures or convulsions: N  Have you ever temporarily lost control of your hand or foot : N   Had a stroke or been paralyzed : N  Temporarily lost your ability to speak: N  Fainted or lost consciousness: N  Hallucinations: N  Nervousness: N  Do you take medications for your nerves: N  Trouble falling asleep or staying asleep: y  Do you feel tired even after a good night sleep: y  Do you feel down in the dumps or depressed: N  Frequent crying: N  Using alcohol excessively: N  Any street drug use : N  Do have any other medical problems that are concerns :      Review of Systems   Neurological:  Positive for headaches. Negative for dizziness and numbness.       Objective   /80   Pulse 96   Temp 36.7 °C (98.1 °F)   Ht 1.613 m (5' 3.5\")   Wt 62.6 kg (138 lb)   SpO2 98%   BMI 24.06 kg/m²     Physical Exam  Constitutional:       General: She is not in acute distress.     Appearance: Normal appearance.   HENT:      Head: Normocephalic and atraumatic.      Left Ear: Tympanic membrane, ear canal and external ear normal.      Ears:      Comments: Right external auditory canal with cerumen impaction, removed     Nose: Nose normal.      Mouth/Throat:      Mouth: Mucous membranes are moist.      Pharynx: No oropharyngeal exudate or posterior oropharyngeal erythema.   Eyes:      Extraocular Movements: Extraocular movements intact.      Conjunctiva/sclera: Conjunctivae normal.      Pupils: Pupils are equal, round, and reactive to light.   Cardiovascular:      Rate and Rhythm: Normal rate and regular rhythm.      Heart sounds: No murmur heard.  Pulmonary:      Effort: Pulmonary effort is normal.      Breath sounds: Normal breath sounds.   Abdominal:      General: Bowel sounds are normal.      Palpations: Abdomen is soft.   Musculoskeletal:         General: Normal range of motion.      Cervical back: No rigidity.        Legs:       Comments: Right anterior medial leg with soft tissue " mass/fullness  Soft, relatively smooth feels fatty in consistency, visible asymmetry with the right lower extremity versus the left, diameter is about 6 cm, nontender   Lymphadenopathy:      Cervical: No cervical adenopathy.   Skin:     General: Skin is warm and dry.      Findings: No rash.   Neurological:      General: No focal deficit present.      Mental Status: She is alert and oriented to person, place, and time.      Cranial Nerves: No cranial nerve deficit.      Gait: Gait normal.   Psychiatric:         Mood and Affect: Mood normal.         Behavior: Behavior normal.         Assessment/Plan   Problem List Items Addressed This Visit           ICD-10-CM    Vitamin D deficiency E55.9    Relevant Orders    CBC and Auto Differential    Comprehensive Metabolic Panel    Lipid Panel    Thyroid Stimulating Hormone    Hemoglobin A1c    Vitamin D 25-Hydroxy,Total (for eval of Vitamin D levels)     Other Visit Diagnoses         Codes      Routine general medical examination at a health care facility    -  Primary Z00.00    Relevant Orders    CBC and Auto Differential    Comprehensive Metabolic Panel    Lipid Panel    Thyroid Stimulating Hormone    Hemoglobin A1c    Vitamin D 25-Hydroxy,Total (for eval of Vitamin D levels)      Lipid screening     Z13.220    Relevant Orders    CBC and Auto Differential    Comprehensive Metabolic Panel    Lipid Panel    Thyroid Stimulating Hormone    Hemoglobin A1c    Vitamin D 25-Hydroxy,Total (for eval of Vitamin D levels)      Chronic migraine with aura without status migrainosus, not intractable     G43.E09    Relevant Orders    CBC and Auto Differential    Comprehensive Metabolic Panel    Lipid Panel    Thyroid Stimulating Hormone    Hemoglobin A1c    Vitamin D 25-Hydroxy,Total (for eval of Vitamin D levels)      Pain of right lower extremity     M79.604    Relevant Orders    Hemoglobin A1c    Vitamin D 25-Hydroxy,Total (for eval of Vitamin D levels)      Impacted cerumen of right ear      H61.21    Relevant Orders    Hemoglobin A1c    Vitamin D 25-Hydroxy,Total (for eval of Vitamin D levels)      Mass of right lower leg     R22.41    Relevant Orders    MR tibia fibula right w and wo IV contrast    Hemoglobin A1c    Vitamin D 25-Hydroxy,Total (for eval of Vitamin D levels)

## 2025-05-08 NOTE — PATIENT INSTRUCTIONS
Get your blood work as ordered.  You should hear from our office with results whether they are normal are not within a few days.  Please call the office if you do not hear from us.     Headaches: Headaches are very common and there is multiple different reasons why people can have them.  In general most of the population have periodic headaches that respond to over-the-counter medications.  If you have a drastic change in the quality and quantity of your headaches, however, that may require further evaluation.  It is important to assess whether there are specific triggers for headaches.  A lot of people notice increased headaches with not sleeping , poor diet, stress or illness.  Make sure  you're getting adequate rest and eating a healthy diet.  Reducing your intake of caffeine can improve how often you have headaches as well.  Please notify your doctor if you have increased headaches or changes in headaches.  If you're taking prescription medications for your headaches and you're having side effects or problems with those medications or if they are not working please let us know.    MRI of the soft tissue to further assess the extremity mass

## 2025-05-08 NOTE — LETTER
May 9, 2025     Patient: Kait Garnica   YOB: 1995   Date of Visit: 5/8/2025       To Whom It May Concern:    Kait Garnica was seen in my clinic on 5/8/2025. Please excuse Kait for her absence from work on this day to make the appointment.    If you have any questions or concerns, please don't hesitate to call.         Sincerely,         Alycia Escobar MD        CC: No Recipients

## 2025-05-14 LAB
ALBUMIN SERPL-MCNC: 4.3 G/DL (ref 3.6–5.1)
ALP SERPL-CCNC: 34 U/L (ref 31–125)
ALT SERPL-CCNC: 12 U/L (ref 6–29)
ANION GAP SERPL CALCULATED.4IONS-SCNC: 9 MMOL/L (CALC) (ref 7–17)
AST SERPL-CCNC: 14 U/L (ref 10–30)
BASOPHILS # BLD AUTO: 31 CELLS/UL (ref 0–200)
BASOPHILS NFR BLD AUTO: 0.6 %
BILIRUB SERPL-MCNC: 0.5 MG/DL (ref 0.2–1.2)
BUN SERPL-MCNC: 12 MG/DL (ref 7–25)
CALCIUM SERPL-MCNC: 9.4 MG/DL (ref 8.6–10.2)
CHLORIDE SERPL-SCNC: 105 MMOL/L (ref 98–110)
CHOLEST SERPL-MCNC: 203 MG/DL
CHOLEST/HDLC SERPL: 2.7 (CALC)
CO2 SERPL-SCNC: 24 MMOL/L (ref 20–32)
CREAT SERPL-MCNC: 0.82 MG/DL (ref 0.5–0.96)
EGFRCR SERPLBLD CKD-EPI 2021: 99 ML/MIN/1.73M2
EOSINOPHIL # BLD AUTO: 78 CELLS/UL (ref 15–500)
EOSINOPHIL NFR BLD AUTO: 1.5 %
ERYTHROCYTE [DISTWIDTH] IN BLOOD BY AUTOMATED COUNT: 12 % (ref 11–15)
GLUCOSE SERPL-MCNC: 88 MG/DL (ref 65–99)
HCT VFR BLD AUTO: 42.6 % (ref 35–45)
HDLC SERPL-MCNC: 74 MG/DL
HGB BLD-MCNC: 13.7 G/DL (ref 11.7–15.5)
LDLC SERPL CALC-MCNC: 110 MG/DL (CALC)
LYMPHOCYTES # BLD AUTO: 1992 CELLS/UL (ref 850–3900)
LYMPHOCYTES NFR BLD AUTO: 38.3 %
MCH RBC QN AUTO: 31.1 PG (ref 27–33)
MCHC RBC AUTO-ENTMCNC: 32.2 G/DL (ref 32–36)
MCV RBC AUTO: 96.6 FL (ref 80–100)
MONOCYTES # BLD AUTO: 333 CELLS/UL (ref 200–950)
MONOCYTES NFR BLD AUTO: 6.4 %
NEUTROPHILS # BLD AUTO: 2766 CELLS/UL (ref 1500–7800)
NEUTROPHILS NFR BLD AUTO: 53.2 %
NONHDLC SERPL-MCNC: 129 MG/DL (CALC)
PLATELET # BLD AUTO: 327 THOUSAND/UL (ref 140–400)
PMV BLD REES-ECKER: 10.6 FL (ref 7.5–12.5)
POTASSIUM SERPL-SCNC: 4.7 MMOL/L (ref 3.5–5.3)
PROT SERPL-MCNC: 7.2 G/DL (ref 6.1–8.1)
RBC # BLD AUTO: 4.41 MILLION/UL (ref 3.8–5.1)
SODIUM SERPL-SCNC: 138 MMOL/L (ref 135–146)
TRIGL SERPL-MCNC: 93 MG/DL
TSH SERPL-ACNC: 1.56 MIU/L
WBC # BLD AUTO: 5.2 THOUSAND/UL (ref 3.8–10.8)

## 2025-05-15 LAB
25(OH)D3+25(OH)D2 SERPL-MCNC: 25 NG/ML (ref 30–100)
EST. AVERAGE GLUCOSE BLD GHB EST-MCNC: 103 MG/DL
EST. AVERAGE GLUCOSE BLD GHB EST-SCNC: 5.7 MMOL/L
HBA1C MFR BLD: 5.2 %

## 2025-05-16 ENCOUNTER — TELEPHONE (OUTPATIENT)
Dept: PRIMARY CARE | Facility: CLINIC | Age: 30
End: 2025-05-16
Payer: COMMERCIAL

## 2025-05-16 DIAGNOSIS — R22.41 MASS OF RIGHT LOWER LEG: Primary | ICD-10-CM

## 2025-05-16 NOTE — TELEPHONE ENCOUNTER
Ppt informed to cancel mri due to not being covered  by insurance, pt to call and schedule ultra sound.  Pt expressed understanding

## 2025-05-16 NOTE — TELEPHONE ENCOUNTER
----- Message from Alycia Escobar sent at 5/16/2025  9:37 AM EDT -----  Please call the patient, her insurance will not cover an MRI until we do an ultrasound of the leg I put in an order, the MRI will need to be canceled

## 2025-05-25 ENCOUNTER — APPOINTMENT (OUTPATIENT)
Dept: RADIOLOGY | Facility: HOSPITAL | Age: 30
End: 2025-05-25
Payer: COMMERCIAL

## 2025-05-27 ENCOUNTER — HOSPITAL ENCOUNTER (OUTPATIENT)
Dept: RADIOLOGY | Facility: HOSPITAL | Age: 30
Discharge: HOME | End: 2025-05-27
Payer: COMMERCIAL

## 2025-05-27 DIAGNOSIS — R22.41 MASS OF RIGHT LOWER LEG: ICD-10-CM

## 2025-05-27 PROCEDURE — 76882 US LMTD JT/FCL EVL NVASC XTR: CPT | Performed by: STUDENT IN AN ORGANIZED HEALTH CARE EDUCATION/TRAINING PROGRAM

## 2025-05-27 PROCEDURE — 76881 US COMPL JOINT R-T W/IMG: CPT

## 2025-05-29 NOTE — RESULT ENCOUNTER NOTE
The ultrasound does not show anything of concern, no masses in that area  If persisting issues with discomfort I would suggest seeing orthopedics